# Patient Record
Sex: FEMALE | Race: WHITE | NOT HISPANIC OR LATINO | Employment: OTHER | URBAN - METROPOLITAN AREA
[De-identification: names, ages, dates, MRNs, and addresses within clinical notes are randomized per-mention and may not be internally consistent; named-entity substitution may affect disease eponyms.]

---

## 2022-06-17 RX ORDER — ACETAMINOPHEN, DIPHENHYDRAMINE HCL, PHENYLEPHRINE HCL 325; 25; 5 MG/1; MG/1; MG/1
1 TABLET ORAL NIGHTLY PRN
COMMUNITY

## 2022-06-17 RX ORDER — HYDROXYZINE HYDROCHLORIDE 25 MG/1
25 TABLET, FILM COATED ORAL EVERY 8 HOURS PRN
COMMUNITY
Start: 2022-06-03

## 2022-06-17 RX ORDER — ASPIRIN 81 MG/1
81 TABLET ORAL
COMMUNITY

## 2022-06-17 RX ORDER — METOPROLOL TARTRATE 100 MG/1
100 TABLET ORAL 2 TIMES DAILY
COMMUNITY
Start: 2022-03-14

## 2022-06-17 RX ORDER — LEVOTHYROXINE SODIUM 75 UG/1
75 TABLET ORAL EVERY MORNING
COMMUNITY
Start: 2022-06-03

## 2022-06-17 RX ORDER — HYDRALAZINE HYDROCHLORIDE 100 MG/1
100 TABLET, FILM COATED ORAL 4 TIMES DAILY
COMMUNITY
Start: 2022-05-24

## 2022-06-17 RX ORDER — TIZANIDINE 4 MG/1
4 TABLET ORAL EVERY 8 HOURS PRN
COMMUNITY
Start: 2022-06-03

## 2022-06-17 RX ORDER — ACETAMINOPHEN 500 MG
500 TABLET ORAL EVERY 6 HOURS PRN
COMMUNITY

## 2022-06-17 RX ORDER — BENZONATATE 100 MG/1
100 CAPSULE ORAL EVERY 6 HOURS PRN
COMMUNITY
Start: 2021-12-09 | End: 2022-06-21 | Stop reason: ALTCHOICE

## 2022-06-17 RX ORDER — FUROSEMIDE 40 MG/1
2 TABLET ORAL DAILY
COMMUNITY
Start: 2021-10-28

## 2022-06-17 RX ORDER — ROPINIROLE 2 MG/1
2 TABLET, FILM COATED ORAL NIGHTLY
COMMUNITY
Start: 2022-06-03

## 2022-06-17 RX ORDER — TRAZODONE HYDROCHLORIDE 50 MG/1
50 TABLET ORAL NIGHTLY PRN
COMMUNITY
Start: 2022-06-03

## 2022-06-17 RX ORDER — POTASSIUM CHLORIDE 750 MG/1
1 TABLET, EXTENDED RELEASE ORAL 2 TIMES DAILY
COMMUNITY
Start: 2021-10-28

## 2022-06-21 ENCOUNTER — OFFICE VISIT (OUTPATIENT)
Dept: NEPHROLOGY | Facility: CLINIC | Age: 67
End: 2022-06-21
Payer: MEDICARE

## 2022-06-21 VITALS
WEIGHT: 154.69 LBS | BODY MASS INDEX: 26.41 KG/M2 | TEMPERATURE: 98 F | OXYGEN SATURATION: 95 % | DIASTOLIC BLOOD PRESSURE: 77 MMHG | HEART RATE: 105 BPM | HEIGHT: 64 IN | SYSTOLIC BLOOD PRESSURE: 162 MMHG

## 2022-06-21 DIAGNOSIS — N18.31 STAGE 3A CHRONIC KIDNEY DISEASE: Primary | ICD-10-CM

## 2022-06-21 DIAGNOSIS — I10 PRIMARY HYPERTENSION: ICD-10-CM

## 2022-06-21 PROCEDURE — 99213 OFFICE O/P EST LOW 20 MIN: CPT | Mod: S$GLB,,, | Performed by: INTERNAL MEDICINE

## 2022-06-21 PROCEDURE — 99213 PR OFFICE/OUTPT VISIT, EST, LEVL III, 20-29 MIN: ICD-10-PCS | Mod: S$GLB,,, | Performed by: INTERNAL MEDICINE

## 2022-06-21 RX ORDER — LISINOPRIL 20 MG/1
20 TABLET ORAL DAILY
Qty: 90 TABLET | Refills: 3 | Status: SHIPPED | OUTPATIENT
Start: 2022-06-21 | End: 2022-10-24 | Stop reason: SDUPTHER

## 2022-06-21 NOTE — PROGRESS NOTES
Nephrology Clinic Note           Pt Name:  Tequila Keating  Pt :  1955  Pt MRN:  [id number]    Date: 2022  Provider: Dash Santos      Chief Complaint: No chief complaint on file.      HPI:  Tequila Keating is a 67 y.o. female presenting for chronic kidney disease stage III.  History is significant for high blood pressure,. She was initially referred to us from her OB/GYN doctor for abnormal renal function, she has been told for abnormal renal function for a while maybe in   her kidney function was getting worse up to 1.7 creatinine  and she was referred to us.  Last time was seen in 10/2020 since that time  admitted for colitis and had an episode of RUKHSANA creatinine up to 3.2.  Today in the office no shortness of breath, no chest pain no urinary symptoms like dysuria or hematuria, still has the swelling more prominent on the left leg.  Reports blood pressure not control up to 150/90 most of the time looks like to be off fo lisinopril and hctz.    Used to take back in the-NSAIDs which were stopped maybe a year ago.  No family history for kidney problems.           Review of Systems   Gen: no fever, chills, fatigue, weight loss/gain  HEENT: no vision changes, no hearing deficits, no nasal discharge  Respiratory: no cough, dyspnea  CVS: no chest pain, PND, orthopnea  Abd: no nausea, vomiting, abdominal pain, diarrhea, constipation  : no hematuria, dysuria, urinary retention  Ext: no edema, joint and muscle pains  Neuro: no weakness, no numbness  Skin: no rashes, no lesions  Psych: no depression, no anxiety    History:   Past Medical History:   Diagnosis Date    Angioedema     Baker's cyst     CAD (coronary artery disease)     CHF (congestive heart failure)     Chronic tension headaches     CKD (chronic kidney disease)     Fracture of spinous process of thoracic vertebra     T5, T7, T11    Hiatal hernia     HTN (hypertension)     Hypercholesteremia     Hypothyroidism      Mitral regurgitation     Opiate dependence     Opiate overdose     Restless legs syndrome (RLS)     Urinary tract infection, site not specified      Past Surgical History:   Procedure Laterality Date    FRACTURE SURGERY      HYSTERECTOMY       Family History   Problem Relation Age of Onset    Hypertension Mother     Heart disease Mother     Stroke Sister     Hypertension Sister     Diabetes Sister     Cancer Sister     Arthritis Sister     COPD Sister     Depression Sister     Early death Sister     Early death Brother     Stroke Maternal Grandmother     Heart disease Maternal Grandmother      Social History     Substance and Sexual Activity   Alcohol Use Never     Social History     Substance and Sexual Activity   Drug Use Not Currently     Social History     Substance and Sexual Activity   Sexual Activity Not Currently     reports previously being sexually active.  Social History     Tobacco Use   Smoking Status Former Smoker    Packs/day: 0.50   Smokeless Tobacco Never Used       Allergies:  Review of patient's allergies indicates:   Allergen Reactions    Adhesive Rash and Swelling    Morphine        [unfilled]       Physical Exam  Vitals:   There were no vitals filed for this visit.  There is no height or weight on file to calculate BMI.    Vital signs:   Wt Readings from Last 3 Encounters:   No data found for Wt     Temp Readings from Last 3 Encounters:   No data found for Temp     BP Readings from Last 3 Encounters:   No data found for BP     Pulse Readings from Last 3 Encounters:   No data found for Pulse       @FLOWSTAT(5:24::1)@      GENERAL ASSESSMENT: awake and alert in no acute distress.  Eyes: anicteric sclera, no erythema or discharge.  HENT: Normocephalic, atraumatic, moist mucus membranes  Neck: Supple, no thyromegaly or lymphadenopathy   SKIN EXAM: no rash, ecchymosis.  Cardiovascular: regular rate and rhythm, S1 S2 heard.  No murmurs, rubs or gallops.  Respiratory: no  "rales, no wheezes or rhonchi  GI: BS+, NT, ND, no organomegaly.  : No garrett   MSK: 2+ on the left lower extremity edema, plus edema on the right.  No joint stiffness, effusions  NEURO: alert and oriented,  PSYCH: answers questions appropriately, organized thinking   Nails: No clubbing, no pallor         Labs/Tests:  @2YRLABRCNT(Na:3,K:3,CL:3,CO2:3,BUN:3,CREATININE:3,GLUCOSE:3,CALCIUM:3,PHOS:3,ALBUMIN:3,ANIONGAP:3,EGFRNONAA:3,EGFRAA:3,HGB:3,HGBA1C:3,TRANSFERRIN:3,IRON:3,TIBC:3,LABIRON:3,FERRITIN:3,TRIG:3,CHOL:3,HDL:3,LDLCALC:3,LABVLDL:3,NHDLCHOL:3)@   @2YRLABRCNT(pthi:*,[id number]:*)@  @2YRLABRCNT(PROCREA:3,PROTUR:3,CREATININEUR:3)@      @2YRLABRCNT(COLORUR:*,CLARITYUR:*,SPECGRAVUR:*,PHUR:*,PROTUR:*,BILIRUBINUR:*,BLOODUR:*,KETONESUR:*,UROB:*,NITRITEUR:*,LEUKOCYTESUR:*,HYCST:*,WBCUAMN:*,RBCUAMN:*,EPIUAMN:*,BACTM:*)@    "@2YRLABRCNT(URICACID:*)@       Renal US right kidney 10.1 cm, left kidney 9.8 cm no hydrourinary retention.:  Impression:    Assessment & Plan:      Visit Diagnosis  No diagnosis found.      Plan    1. Hypertension-currently on metoprolol 100 mg twice a day and hydralazine 100 mg twice a day. Will restart her on Lisinpril 20 mg daily  Target blood pressure should be around 130/80 low-salt diet discussed with the patient follow-up in 3 months with BP log from home.  2. CKD stage III-around 1.23-1.7 creatinine most likely her renal dysfunction related to high blood pressure and remote history for NSAID usage.  The patient informed of the risk factors associated with kidney disease and its progression.  Follow-up in 3 months.    1.       Orders this visit   No orders of the defined types were placed in this encounter.         Follow Up:   No follow-ups on file.    Dash Santos M.D.  Nephrology  06/21/2022  4:23 PM            "

## 2022-10-14 DIAGNOSIS — I10 ESSENTIAL HYPERTENSION, BENIGN: ICD-10-CM

## 2022-10-14 DIAGNOSIS — E55.9 VITAMIN D DEFICIENCY: ICD-10-CM

## 2022-10-14 DIAGNOSIS — N18.32 STAGE 3B CHRONIC KIDNEY DISEASE: Primary | ICD-10-CM

## 2022-10-14 DIAGNOSIS — N18.9 ANEMIA OF CHRONIC RENAL FAILURE, UNSPECIFIED CKD STAGE: ICD-10-CM

## 2022-10-14 DIAGNOSIS — N25.81 SECONDARY HYPERPARATHYROIDISM OF RENAL ORIGIN: ICD-10-CM

## 2022-10-14 DIAGNOSIS — D63.1 ANEMIA OF CHRONIC RENAL FAILURE, UNSPECIFIED CKD STAGE: ICD-10-CM

## 2022-10-24 ENCOUNTER — OFFICE VISIT (OUTPATIENT)
Dept: NEPHROLOGY | Facility: CLINIC | Age: 67
End: 2022-10-24
Payer: MEDICAID

## 2022-10-24 VITALS
SYSTOLIC BLOOD PRESSURE: 170 MMHG | BODY MASS INDEX: 25.56 KG/M2 | DIASTOLIC BLOOD PRESSURE: 90 MMHG | HEART RATE: 68 BPM | HEIGHT: 64 IN | WEIGHT: 149.69 LBS | OXYGEN SATURATION: 97 %

## 2022-10-24 DIAGNOSIS — N18.31 STAGE 3A CHRONIC KIDNEY DISEASE: Primary | ICD-10-CM

## 2022-10-24 DIAGNOSIS — I10 PRIMARY HYPERTENSION: ICD-10-CM

## 2022-10-24 PROCEDURE — 3066F PR DOCUMENTATION OF TREATMENT FOR NEPHROPATHY: ICD-10-PCS | Mod: CPTII,S$GLB,, | Performed by: INTERNAL MEDICINE

## 2022-10-24 PROCEDURE — 3066F NEPHROPATHY DOC TX: CPT | Mod: CPTII,S$GLB,, | Performed by: INTERNAL MEDICINE

## 2022-10-24 PROCEDURE — 4010F ACE/ARB THERAPY RXD/TAKEN: CPT | Mod: CPTII,S$GLB,, | Performed by: INTERNAL MEDICINE

## 2022-10-24 PROCEDURE — 3077F SYST BP >= 140 MM HG: CPT | Mod: CPTII,S$GLB,, | Performed by: INTERNAL MEDICINE

## 2022-10-24 PROCEDURE — 1160F PR REVIEW ALL MEDS BY PRESCRIBER/CLIN PHARMACIST DOCUMENTED: ICD-10-PCS | Mod: CPTII,S$GLB,, | Performed by: INTERNAL MEDICINE

## 2022-10-24 PROCEDURE — 1159F PR MEDICATION LIST DOCUMENTED IN MEDICAL RECORD: ICD-10-PCS | Mod: CPTII,S$GLB,, | Performed by: INTERNAL MEDICINE

## 2022-10-24 PROCEDURE — 1101F PR PT FALLS ASSESS DOC 0-1 FALLS W/OUT INJ PAST YR: ICD-10-PCS | Mod: CPTII,S$GLB,, | Performed by: INTERNAL MEDICINE

## 2022-10-24 PROCEDURE — 4010F PR ACE/ARB THEARPY RXD/TAKEN: ICD-10-PCS | Mod: CPTII,S$GLB,, | Performed by: INTERNAL MEDICINE

## 2022-10-24 PROCEDURE — 3288F FALL RISK ASSESSMENT DOCD: CPT | Mod: CPTII,S$GLB,, | Performed by: INTERNAL MEDICINE

## 2022-10-24 PROCEDURE — 3077F PR MOST RECENT SYSTOLIC BLOOD PRESSURE >= 140 MM HG: ICD-10-PCS | Mod: CPTII,S$GLB,, | Performed by: INTERNAL MEDICINE

## 2022-10-24 PROCEDURE — 3288F PR FALLS RISK ASSESSMENT DOCUMENTED: ICD-10-PCS | Mod: CPTII,S$GLB,, | Performed by: INTERNAL MEDICINE

## 2022-10-24 PROCEDURE — 99213 PR OFFICE/OUTPT VISIT, EST, LEVL III, 20-29 MIN: ICD-10-PCS | Mod: S$GLB,,, | Performed by: INTERNAL MEDICINE

## 2022-10-24 PROCEDURE — 1159F MED LIST DOCD IN RCRD: CPT | Mod: CPTII,S$GLB,, | Performed by: INTERNAL MEDICINE

## 2022-10-24 PROCEDURE — 1125F PR PAIN SEVERITY QUANTIFIED, PAIN PRESENT: ICD-10-PCS | Mod: CPTII,S$GLB,, | Performed by: INTERNAL MEDICINE

## 2022-10-24 PROCEDURE — 1160F RVW MEDS BY RX/DR IN RCRD: CPT | Mod: CPTII,S$GLB,, | Performed by: INTERNAL MEDICINE

## 2022-10-24 PROCEDURE — 1125F AMNT PAIN NOTED PAIN PRSNT: CPT | Mod: CPTII,S$GLB,, | Performed by: INTERNAL MEDICINE

## 2022-10-24 PROCEDURE — 1101F PT FALLS ASSESS-DOCD LE1/YR: CPT | Mod: CPTII,S$GLB,, | Performed by: INTERNAL MEDICINE

## 2022-10-24 PROCEDURE — 99213 OFFICE O/P EST LOW 20 MIN: CPT | Mod: S$GLB,,, | Performed by: INTERNAL MEDICINE

## 2022-10-24 PROCEDURE — 3080F DIAST BP >= 90 MM HG: CPT | Mod: CPTII,S$GLB,, | Performed by: INTERNAL MEDICINE

## 2022-10-24 PROCEDURE — 3080F PR MOST RECENT DIASTOLIC BLOOD PRESSURE >= 90 MM HG: ICD-10-PCS | Mod: CPTII,S$GLB,, | Performed by: INTERNAL MEDICINE

## 2022-10-24 RX ORDER — LISINOPRIL 20 MG/1
20 TABLET ORAL DAILY
Qty: 90 TABLET | Refills: 3 | Status: SHIPPED | OUTPATIENT
Start: 2022-10-24 | End: 2023-10-30

## 2022-10-24 NOTE — PROGRESS NOTES
Nephrology Clinic Note           Pt Name:  Tequila Keating  Pt :  1955  Pt MRN:  [id number]    Date: 10/24/2022  Provider: Dash Santos      Chief Complaint:   Chief Complaint   Patient presents with    Chronic Kidney Disease       HPI:  Tequila Keating is a 67 y.o. female presenting for chronic kidney disease stage III.  History is significant for high blood pressure,. She was initially referred to us from her OB/GYN doctor for abnormal renal function, she has been told for abnormal renal function for a while maybe in   her kidney function was getting worse up to 1.7 creatinine  and she was referred to us.  Last time was seen in 10/2020 since that time  admitted for colitis and had an episode of RUKHSANA creatinine up to 3.2.    Since her last visit no new medical events. Still the BP not control  Today in the office no shortness of breath, no chest pain no urinary symptoms like dysuria or hematuria.    Used to take back in the-NSAIDs which were stopped maybe a year ago.  No family history for kidney problems.           Review of Systems   Gen: no fever, chills, fatigue, weight loss/gain  HEENT: no vision changes, no hearing deficits, no nasal discharge  Respiratory: no cough, dyspnea  CVS: no chest pain, PND, orthopnea  Abd: no nausea, vomiting, abdominal pain, diarrhea, constipation  : no hematuria, dysuria, urinary retention  Ext: no edema, joint and muscle pains  Neuro: no weakness, no numbness  Skin: no rashes, no lesions  Psych: no depression, no anxiety    History:   Past Medical History:   Diagnosis Date    Angioedema     Baker's cyst     CAD (coronary artery disease)     CHF (congestive heart failure)     Chronic tension headaches     CKD (chronic kidney disease)     Fracture of spinous process of thoracic vertebra     T5, T7, T11    Hiatal hernia     HTN (hypertension)     Hypercholesteremia     Hypothyroidism     Mitral regurgitation     Opiate dependence     Opiate overdose  "    Restless legs syndrome (RLS)     Urinary tract infection, site not specified      Past Surgical History:   Procedure Laterality Date    FRACTURE SURGERY      HYSTERECTOMY       Family History   Problem Relation Age of Onset    Hypertension Mother     Heart disease Mother     Stroke Sister     Hypertension Sister     Diabetes Sister     Cancer Sister     Arthritis Sister     COPD Sister     Depression Sister     Early death Sister     Early death Brother     Stroke Maternal Grandmother     Heart disease Maternal Grandmother      Social History     Substance and Sexual Activity   Alcohol Use Never     Social History     Substance and Sexual Activity   Drug Use Not Currently     Social History     Substance and Sexual Activity   Sexual Activity Not Currently     reports that she is not currently sexually active.  Social History     Tobacco Use   Smoking Status Former    Packs/day: 0.50    Types: Cigarettes   Smokeless Tobacco Never       Allergies:  Review of patient's allergies indicates:   Allergen Reactions    Adhesive Rash and Swelling    Morphine        [unfilled]       Physical Exam  Vitals:   Vitals:    10/24/22 1542   BP: (!) 170/90   Pulse: 68   SpO2: 97%   Weight: 67.9 kg (149 lb 11.2 oz)   Height: 5' 4" (1.626 m)   PainSc:   5   PainLoc: Arm     Body mass index is 25.7 kg/m².    Vital signs:   Wt Readings from Last 3 Encounters:   10/24/22 67.9 kg (149 lb 11.2 oz)   06/21/22 70.2 kg (154 lb 11.2 oz)     Temp Readings from Last 3 Encounters:   06/21/22 98 °F (36.7 °C) (Oral)     BP Readings from Last 3 Encounters:   10/24/22 (!) 170/90   06/21/22 (!) 162/77     Pulse Readings from Last 3 Encounters:   10/24/22 68   06/21/22 105       @FLOWSTAT(5:24::1)@      GENERAL ASSESSMENT: awake and alert in no acute distress.  Eyes: anicteric sclera, no erythema or discharge.  HENT: Normocephalic, atraumatic, moist mucus membranes  Neck: Supple, no thyromegaly or lymphadenopathy   SKIN EXAM: no rash, " "ecchymosis.  Cardiovascular: regular rate and rhythm, S1 S2 heard.  No murmurs, rubs or gallops.  Respiratory: no rales, no wheezes or rhonchi  GI: BS+, NT, ND, no organomegaly.  : No garrett   MSK: 2+ on the left lower extremity edema, plus edema on the right.  No joint stiffness, effusions  NEURO: alert and oriented,  PSYCH: answers questions appropriately, organized thinking   Nails: No clubbing, no pallor         Labs/Tests:  @2YRLABRCNT(Na:3,K:3,CL:3,CO2:3,BUN:3,CREATININE:3,GLUCOSE:3,CALCIUM:3,PHOS:3,ALBUMIN:3,ANIONGAP:3,EGFRNONAA:3,EGFRAA:3,HGB:3,HGBA1C:3,TRANSFERRIN:3,IRON:3,TIBC:3,LABIRON:3,FERRITIN:3,TRIG:3,CHOL:3,HDL:3,LDLCALC:3,LABVLDL:3,NHDLCHOL:3)@   @2YRLABRCNT(pthi:*,[id number]:*)@  @2YRLABRCNT(PROCREA:3,PROTUR:3,CREATININEUR:3)@      @2YRLABRCNT(COLORUR:*,CLARITYUR:*,SPECGRAVUR:*,PHUR:*,PROTUR:*,BILIRUBINUR:*,BLOODUR:*,KETONESUR:*,UROB:*,NITRITEUR:*,LEUKOCYTESUR:*,HYCST:*,WBCUAMN:*,RBCUAMN:*,EPIUAMN:*,BACTM:*)@    "@2YRLABRCNT(URICACID:*)@                               Renal US right kidney 10.1 cm, left kidney 9.8 cm no hydrourinary retention.:  Impression:    Assessment & Plan:      Visit Diagnosis  1. Primary hypertension    2. Stage 3a chronic kidney disease          Plan    Hypertension-currently on metoprolol 100 mg twice a day and hydralazine 100 mg twice a day. To be on Lisinopril 20 mg daily  Target blood pressure should be around 130/80 low-salt diet discussed with the patient follow-up in 3 months with BP log from home.  CKD stage III-around 1.2-1.7 creatinine most likely her renal dysfunction related to high blood pressure and remote history for NSAID usage.  The patient informed of the risk factors associated with kidney disease and its progression.  Follow-up in 6 months.          Orders this visit   No orders of the defined types were placed in this encounter.         Follow Up:   No follow-ups on file.    Dash Santos M.D.  Nephrology  10/24/2022  4:23 PM              "

## 2023-04-20 ENCOUNTER — DOCUMENTATION ONLY (OUTPATIENT)
Dept: NEPHROLOGY | Facility: CLINIC | Age: 68
End: 2023-04-20
Payer: MEDICAID

## 2023-04-24 DIAGNOSIS — N18.32 STAGE 3B CHRONIC KIDNEY DISEASE: Primary | ICD-10-CM

## 2023-04-25 ENCOUNTER — OFFICE VISIT (OUTPATIENT)
Dept: NEPHROLOGY | Facility: CLINIC | Age: 68
End: 2023-04-25
Payer: MEDICAID

## 2023-04-25 VITALS
DIASTOLIC BLOOD PRESSURE: 75 MMHG | SYSTOLIC BLOOD PRESSURE: 100 MMHG | OXYGEN SATURATION: 97 % | HEIGHT: 64 IN | BODY MASS INDEX: 25.81 KG/M2 | WEIGHT: 151.19 LBS | HEART RATE: 63 BPM

## 2023-04-25 DIAGNOSIS — N18.31 STAGE 3A CHRONIC KIDNEY DISEASE: Primary | ICD-10-CM

## 2023-04-25 DIAGNOSIS — I10 PRIMARY HYPERTENSION: ICD-10-CM

## 2023-04-25 PROCEDURE — 99213 OFFICE O/P EST LOW 20 MIN: CPT | Mod: S$GLB,,, | Performed by: INTERNAL MEDICINE

## 2023-04-25 PROCEDURE — 99213 PR OFFICE/OUTPT VISIT, EST, LEVL III, 20-29 MIN: ICD-10-PCS | Mod: S$GLB,,, | Performed by: INTERNAL MEDICINE

## 2023-04-25 NOTE — PROGRESS NOTES
Nephrology Clinic Note           Pt Name:  Tequila Keating  Pt :  1955  Pt MRN:  [id number]    Date: 2023  Provider: Dash Santos      Chief Complaint:   Chief Complaint   Patient presents with    Chronic Kidney Disease     STAGE-3A, Cr-1.12, GFR-53       HPI:  Tequila Keating is a 67 y.o. female presenting for chronic kidney disease stage III.  History is significant for high blood pressure,. She was initially referred to us from her OB/GYN doctor for abnormal renal function, she has been told for abnormal renal function for a while maybe in   her kidney function was getting worse up to 1.7 creatinine  and she was referred to us.  Last time was seen in 10/2020 since that time  admitted for colitis and had an episode of RUKHSANA creatinine up to 3.2.    Since her last visit no new medical events. Better control of her BP.  Today in the office no shortness of breath, no chest pain no urinary symptoms like dysuria or hematuria.    Used to take back in the-NSAIDs which were stopped maybe a year ago.  No family history for kidney problems.           Review of Systems   Gen: no fever, chills, fatigue, weight loss/gain  HEENT: no vision changes, no hearing deficits, no nasal discharge  Respiratory: no cough, dyspnea  CVS: no chest pain, PND, orthopnea  Abd: no nausea, vomiting, abdominal pain, diarrhea, constipation  : no hematuria, dysuria, urinary retention  Ext: no edema, joint and muscle pains  Neuro: no weakness, no numbness  Skin: no rashes, no lesions  Psych: no depression, no anxiety    History:   Past Medical History:   Diagnosis Date    Angioedema     Baker's cyst     CAD (coronary artery disease)     CHF (congestive heart failure)     Chronic tension headaches     CKD (chronic kidney disease)     Fracture of spinous process of thoracic vertebra     T5, T7, T11    Hiatal hernia     HTN (hypertension)     Hypercholesteremia     Hypothyroidism     Mitral regurgitation     Opiate  "dependence     Opiate overdose     Restless legs syndrome (RLS)     Urinary tract infection, site not specified      Past Surgical History:   Procedure Laterality Date    FRACTURE SURGERY      HYSTERECTOMY       Family History   Problem Relation Age of Onset    Hypertension Mother     Heart disease Mother     Stroke Sister     Hypertension Sister     Diabetes Sister     Cancer Sister     Arthritis Sister     COPD Sister     Depression Sister     Early death Sister     Early death Brother     Stroke Maternal Grandmother     Heart disease Maternal Grandmother      Social History     Substance and Sexual Activity   Alcohol Use Never     Social History     Substance and Sexual Activity   Drug Use Not Currently     Social History     Substance and Sexual Activity   Sexual Activity Not Currently     reports that she is not currently sexually active.  Social History     Tobacco Use   Smoking Status Former    Packs/day: 0.50    Types: Cigarettes   Smokeless Tobacco Never       Allergies:  Review of patient's allergies indicates:   Allergen Reactions    Adhesive Rash and Swelling    Morphine        [unfilled]       Physical Exam  Vitals:   Vitals:    04/25/23 1608   BP: 100/75   Pulse: 63   SpO2: 97%   Weight: 68.6 kg (151 lb 3.2 oz)   Height: 5' 4" (1.626 m)   PainSc:   4   PainLoc: Back       Body mass index is 25.95 kg/m².    Vital signs:   Wt Readings from Last 3 Encounters:   04/25/23 68.6 kg (151 lb 3.2 oz)   10/24/22 67.9 kg (149 lb 11.2 oz)   06/21/22 70.2 kg (154 lb 11.2 oz)     Temp Readings from Last 3 Encounters:   06/21/22 98 °F (36.7 °C) (Oral)     BP Readings from Last 3 Encounters:   04/25/23 100/75   10/24/22 (!) 170/90   06/21/22 (!) 162/77     Pulse Readings from Last 3 Encounters:   04/25/23 63   10/24/22 68   06/21/22 105       @FLOWSTAT(5:24::1)@      GENERAL ASSESSMENT: awake and alert in no acute distress.  Eyes: anicteric sclera, no erythema or discharge.  HENT: Normocephalic, atraumatic, moist mucus " "membranes  Neck: Supple, no thyromegaly or lymphadenopathy   SKIN EXAM: no rash, ecchymosis.  Cardiovascular: regular rate and rhythm, S1 S2 heard.  No murmurs, rubs or gallops.  Respiratory: no rales, no wheezes or rhonchi  GI: BS+, NT, ND, no organomegaly.  : No garrett   MSK: 2+ on the left lower extremity edema, plus edema on the right.  No joint stiffness, effusions  NEURO: alert and oriented,  PSYCH: answers questions appropriately, organized thinking   Nails: No clubbing, no pallor         Labs/Tests:  @2YRLABRCNT(Na:3,K:3,CL:3,CO2:3,BUN:3,CREATININE:3,GLUCOSE:3,CALCIUM:3,PHOS:3,ALBUMIN:3,ANIONGAP:3,EGFRNONAA:3,EGFRAA:3,HGB:3,HGBA1C:3,TRANSFERRIN:3,IRON:3,TIBC:3,LABIRON:3,FERRITIN:3,TRIG:3,CHOL:3,HDL:3,LDLCALC:3,LABVLDL:3,NHDLCHOL:3)@   @2YRLABRCNT(pthi:*,[id number]:*)@  @2YRLABRCNT(PROCREA:3,PROTUR:3,CREATININEUR:3)@      @2YRLABRCNT(COLORUR:*,CLARITYUR:*,SPECGRAVUR:*,PHUR:*,PROTUR:*,BILIRUBINUR:*,BLOODUR:*,KETONESUR:*,UROB:*,NITRITEUR:*,LEUKOCYTESUR:*,HYCST:*,WBCUAMN:*,RBCUAMN:*,EPIUAMN:*,BACTM:*)@    "@2YRLABRCNT(URICACID:*)@                                   Renal US right kidney 10.1 cm, left kidney 9.8 cm no hydrourinary retention.:  Impression:    Assessment & Plan:      Visit Diagnosis  1. Stage 3a chronic kidney disease    2. Primary hypertension            Plan    Hypertension-currently on metoprolol 100 mg twice a day and hydralazine 100 mg twice a day. To be on Lisinopril 20 mg daily - can decrease it to 10 mg daily.  Target blood pressure should be around 130/80 low-salt diet discussed with the patient follow-up in 6 months with BP log from home.  CKD stage III-around 1.2-1.7 creatinine most likely her renal dysfunction related to high blood pressure and remote history for NSAID usage.  The patient informed of the risk factors associated with kidney disease and its progression.  Follow-up in 6 months.          Orders this visit   Orders Placed This Encounter   Procedures    Hemoglobin    " Protein/Creatinine Ratio, Urine    PTH, Intact    Renal Function Panel    Urinalysis    Uric Acid            Follow Up:   Follow up in about 6 months (around 10/25/2023).    Dash Santos M.D.  Nephrology  04/25/2023  4:23 PM

## 2023-10-30 ENCOUNTER — OFFICE VISIT (OUTPATIENT)
Dept: NEPHROLOGY | Facility: CLINIC | Age: 68
End: 2023-10-30
Payer: MEDICARE

## 2023-10-30 VITALS
HEIGHT: 64 IN | WEIGHT: 152.63 LBS | DIASTOLIC BLOOD PRESSURE: 68 MMHG | SYSTOLIC BLOOD PRESSURE: 141 MMHG | OXYGEN SATURATION: 97 % | BODY MASS INDEX: 26.06 KG/M2 | HEART RATE: 65 BPM

## 2023-10-30 DIAGNOSIS — I10 PRIMARY HYPERTENSION: Primary | ICD-10-CM

## 2023-10-30 DIAGNOSIS — N18.31 STAGE 3A CHRONIC KIDNEY DISEASE: ICD-10-CM

## 2023-10-30 PROCEDURE — 3078F DIAST BP <80 MM HG: CPT | Mod: CPTII,S$GLB,, | Performed by: INTERNAL MEDICINE

## 2023-10-30 PROCEDURE — 1160F PR REVIEW ALL MEDS BY PRESCRIBER/CLIN PHARMACIST DOCUMENTED: ICD-10-PCS | Mod: CPTII,S$GLB,, | Performed by: INTERNAL MEDICINE

## 2023-10-30 PROCEDURE — 99213 PR OFFICE/OUTPT VISIT, EST, LEVL III, 20-29 MIN: ICD-10-PCS | Mod: S$GLB,,, | Performed by: INTERNAL MEDICINE

## 2023-10-30 PROCEDURE — 1101F PR PT FALLS ASSESS DOC 0-1 FALLS W/OUT INJ PAST YR: ICD-10-PCS | Mod: CPTII,S$GLB,, | Performed by: INTERNAL MEDICINE

## 2023-10-30 PROCEDURE — 99213 OFFICE O/P EST LOW 20 MIN: CPT | Mod: S$GLB,,, | Performed by: INTERNAL MEDICINE

## 2023-10-30 PROCEDURE — 3288F PR FALLS RISK ASSESSMENT DOCUMENTED: ICD-10-PCS | Mod: CPTII,S$GLB,, | Performed by: INTERNAL MEDICINE

## 2023-10-30 PROCEDURE — 3066F PR DOCUMENTATION OF TREATMENT FOR NEPHROPATHY: ICD-10-PCS | Mod: CPTII,S$GLB,, | Performed by: INTERNAL MEDICINE

## 2023-10-30 PROCEDURE — 3077F SYST BP >= 140 MM HG: CPT | Mod: CPTII,S$GLB,, | Performed by: INTERNAL MEDICINE

## 2023-10-30 PROCEDURE — 4010F ACE/ARB THERAPY RXD/TAKEN: CPT | Mod: CPTII,S$GLB,, | Performed by: INTERNAL MEDICINE

## 2023-10-30 PROCEDURE — 1101F PT FALLS ASSESS-DOCD LE1/YR: CPT | Mod: CPTII,S$GLB,, | Performed by: INTERNAL MEDICINE

## 2023-10-30 PROCEDURE — 3008F PR BODY MASS INDEX (BMI) DOCUMENTED: ICD-10-PCS | Mod: CPTII,S$GLB,, | Performed by: INTERNAL MEDICINE

## 2023-10-30 PROCEDURE — 1159F PR MEDICATION LIST DOCUMENTED IN MEDICAL RECORD: ICD-10-PCS | Mod: CPTII,S$GLB,, | Performed by: INTERNAL MEDICINE

## 2023-10-30 PROCEDURE — 3288F FALL RISK ASSESSMENT DOCD: CPT | Mod: CPTII,S$GLB,, | Performed by: INTERNAL MEDICINE

## 2023-10-30 PROCEDURE — 3078F PR MOST RECENT DIASTOLIC BLOOD PRESSURE < 80 MM HG: ICD-10-PCS | Mod: CPTII,S$GLB,, | Performed by: INTERNAL MEDICINE

## 2023-10-30 PROCEDURE — 3008F BODY MASS INDEX DOCD: CPT | Mod: CPTII,S$GLB,, | Performed by: INTERNAL MEDICINE

## 2023-10-30 PROCEDURE — 3066F NEPHROPATHY DOC TX: CPT | Mod: CPTII,S$GLB,, | Performed by: INTERNAL MEDICINE

## 2023-10-30 PROCEDURE — 3077F PR MOST RECENT SYSTOLIC BLOOD PRESSURE >= 140 MM HG: ICD-10-PCS | Mod: CPTII,S$GLB,, | Performed by: INTERNAL MEDICINE

## 2023-10-30 PROCEDURE — 1159F MED LIST DOCD IN RCRD: CPT | Mod: CPTII,S$GLB,, | Performed by: INTERNAL MEDICINE

## 2023-10-30 PROCEDURE — 1160F RVW MEDS BY RX/DR IN RCRD: CPT | Mod: CPTII,S$GLB,, | Performed by: INTERNAL MEDICINE

## 2023-10-30 PROCEDURE — 4010F PR ACE/ARB THEARPY RXD/TAKEN: ICD-10-PCS | Mod: CPTII,S$GLB,, | Performed by: INTERNAL MEDICINE

## 2023-10-30 NOTE — PROGRESS NOTES
Nephrology Clinic Note           Pt Name:  Tequila Keating  Pt :  1955  Pt MRN:  [id number]    Date: 10/30/2023  Provider: Dsah Santos      Chief Complaint:   No chief complaint on file.      HPI:  Tequila Keating is a 67 y.o. female presenting for chronic kidney disease stage III.  History is significant for high blood pressure,. She was initially referred to us from her OB/GYN doctor for abnormal renal function, she has been told for abnormal renal function for a while maybe in   her kidney function was getting worse up to 1.7 creatinine  and she was referred to us.  Last time was seen in 10/2020 since that time  admitted for colitis and had an episode of RUKHSANA creatinine up to 3.2.    Since her last visit no new medical events. Better control of her BP. Had some pina in her lower extremities - planned for angiogram, was restricted on her water intake and her lasix was increased.   Today in the office no shortness of breath, no chest pain no urinary symptoms like dysuria or hematuria.    Used to take back in the-NSAIDs which were stopped maybe a year ago.  No family history for kidney problems.           Review of Systems   Gen: no fever, chills, fatigue, weight loss/gain  HEENT: no vision changes, no hearing deficits, no nasal discharge  Respiratory: no cough, dyspnea  CVS: no chest pain, PND, orthopnea  Abd: no nausea, vomiting, abdominal pain, diarrhea, constipation  : no hematuria, dysuria, urinary retention  Ext: no edema, joint and muscle pains  Neuro: no weakness, no numbness  Skin: no rashes, no lesions  Psych: no depression, no anxiety    History:   Past Medical History:   Diagnosis Date    Angioedema     Baker's cyst     CAD (coronary artery disease)     CHF (congestive heart failure)     Chronic tension headaches     CKD (chronic kidney disease)     Fracture of spinous process of thoracic vertebra     T5, T7, T11    Hiatal hernia     HTN (hypertension)      Hypercholesteremia     Hypothyroidism     Mitral regurgitation     Opiate dependence     Opiate overdose     Restless legs syndrome (RLS)     Urinary tract infection, site not specified      Past Surgical History:   Procedure Laterality Date    FRACTURE SURGERY      HYSTERECTOMY       Family History   Problem Relation Age of Onset    Hypertension Mother     Heart disease Mother     Stroke Sister     Hypertension Sister     Diabetes Sister     Cancer Sister     Arthritis Sister     COPD Sister     Depression Sister     Early death Sister     Early death Brother     Stroke Maternal Grandmother     Heart disease Maternal Grandmother      Social History     Substance and Sexual Activity   Alcohol Use Never     Social History     Substance and Sexual Activity   Drug Use Not Currently     Social History     Substance and Sexual Activity   Sexual Activity Not Currently     reports that she is not currently sexually active.  Social History     Tobacco Use   Smoking Status Former    Current packs/day: 0.50    Types: Cigarettes   Smokeless Tobacco Never       Allergies:  Review of patient's allergies indicates:   Allergen Reactions    Adhesive Rash and Swelling    Morphine        [unfilled]       Physical Exam  Vitals:   There were no vitals filed for this visit.      There is no height or weight on file to calculate BMI.    Vital signs:   Wt Readings from Last 3 Encounters:   04/25/23 68.6 kg (151 lb 3.2 oz)   10/24/22 67.9 kg (149 lb 11.2 oz)   06/21/22 70.2 kg (154 lb 11.2 oz)     Temp Readings from Last 3 Encounters:   06/21/22 98 °F (36.7 °C) (Oral)     BP Readings from Last 3 Encounters:   04/25/23 100/75   10/24/22 (!) 170/90   06/21/22 (!) 162/77     Pulse Readings from Last 3 Encounters:   04/25/23 63   10/24/22 68   06/21/22 105       @FLOWSTAT(5:24::1)@      GENERAL ASSESSMENT: awake and alert in no acute distress.  Eyes: anicteric sclera, no erythema or discharge.  HENT: Normocephalic, atraumatic, moist mucus  "membranes  Neck: Supple, no thyromegaly or lymphadenopathy   SKIN EXAM: no rash, ecchymosis.  Cardiovascular: regular rate and rhythm, S1 S2 heard.  No murmurs, rubs or gallops.  Respiratory: no rales, no wheezes or rhonchi  GI: BS+, NT, ND, no organomegaly.  : No garrett   MSK: 2+ on the left lower extremity edema, plus edema on the right.  No joint stiffness, effusions  NEURO: alert and oriented,  PSYCH: answers questions appropriately, organized thinking   Nails: No clubbing, no pallor         Labs/Tests:  @2YRLABRCNT(Na:3,K:3,CL:3,CO2:3,BUN:3,CREATININE:3,GLUCOSE:3,CALCIUM:3,PHOS:3,ALBUMIN:3,ANIONGAP:3,EGFRNONAA:3,EGFRAA:3,HGB:3,HGBA1C:3,TRANSFERRIN:3,IRON:3,TIBC:3,LABIRON:3,FERRITIN:3,TRIG:3,CHOL:3,HDL:3,LDLCALC:3,LABVLDL:3,NHDLCHOL:3)@   @2YRLABRCNT(pthi:*,[id number]:*)@  @2YRLABRCNT(PROCREA:3,PROTUR:3,CREATININEUR:3)@      @2YRLABRCNT(COLORUR:*,CLARITYUR:*,SPECGRAVUR:*,PHUR:*,PROTUR:*,BILIRUBINUR:*,BLOODUR:*,KETONESUR:*,UROB:*,NITRITEUR:*,LEUKOCYTESUR:*,HYCST:*,WBCUAMN:*,RBCUAMN:*,EPIUAMN:*,BACTM:*)@    "@2YRLABRCNT(URICACID:*)@                                                         Renal US right kidney 10.1 cm, left kidney 9.8 cm no hydrourinary retention.:  Impression:    Assessment & Plan:      Visit Diagnosis  No diagnosis found.          Plan    Hypertension-currently on metoprolol 100 mg twice a day and hydralazine 100 mg twice a day. To be on Lisinopril 20 mg daily - can decrease it to 10 mg daily.  Target blood pressure should be around 130/80 low-salt diet discussed with the patient follow-up in 6 months with BP log from home.  CKD stage III-around 1.2-1.7, slightly worsening in the setting of dehyrdation. Talked about the water intake, can decrease her lasix to once a day;  creatinine most likely her renal dysfunction related to high blood pressure and remote history for NSAID usage.  The patient informed of the risk factors associated with kidney disease and its progression.  Follow-up in 4 " months.          Orders this visit   No orders of the defined types were placed in this encounter.           Follow Up:   No follow-ups on file.    Dash Santos M.D.  Nephrology  10/30/2023  4:23 PM